# Patient Record
(demographics unavailable — no encounter records)

---

## 2024-12-12 NOTE — DATA REVIEWED
[de-identified] : Audiogram personally reviewed and interpreted and my findings are as follows (10/14/20) Right: SRT 30dB; WRS 90%; Type C tymp; normal down to mild SNHL Left: SRT 25dB; %; Type A tymp; normal down to mild SNHL

## 2024-12-12 NOTE — HISTORY OF PRESENT ILLNESS
[de-identified] : 70F who presents for 1 year follow-up for cerumen impactions, bilateral tinnitus, and bilateral SNHL. Her ears become plugged easily after a shower. Last audiogram was in 2020. She suffers from chronic constant bilateral tinnitus. It does not prevent her from sleeping. No other ENT complaints.

## 2024-12-12 NOTE — ASSESSMENT
[FreeTextEntry1] : Bilateral Cerumen Impaction - Bilateral cerumen impaction removed under microscopy with instrumentation  Bilateral Nonpulsatile Tinnitus - reviewed audiogram with patient - discussed the etiology of nonpulsatile tinnitus which is usually from SNHL - discussed treatment options which include hearing aids or medications such as tricyclics - patient would like to pursue nortriptyline 10mg QHS - The potential side effects of nortriptyline were discussed at length with the patient which include but are not limited to: dry mouth, constipation, blurred vision, drowsiness, urinary retention, weight loss or gain, excess sweating, dizziness, insomnia, nausea, tremor, confusion, anxiety, sexual dysfunction - f/u in 3 month   SNHL - bilateral symmetric normal down to mild SNHL - audiogram reviewed with patient - discussed connection between hearing loss and dementia/cognitive decline - f/u in 3 months with new audiogram

## 2024-12-12 NOTE — PHYSICAL EXAM
[TextEntry] : General: AAO, no significant distress Psychiatric: affect pleasant and within normal limits Eyes: relatively symmetric, no obvious nystagmus Skin: no significant lesions on face Nose: no significant lesions; patent. Oral Cavity & Oropharynx: no significant deformity or lesions Neck/Lymphatics: no significant masses or abnormal cervical nodes palpated Respiratory: breathing comfortably; no significant distress Neurologic: cranial nerves II-XII grossly intact; EOMI Facial function: symmetric   Ear examination performed under binocular otologic microscope: Left Ear External: Pinna and periauricular area is normal. Canal: Ear canal skin is not inflamed or edematous. Left cerumen impaction cleaned under microscopy with instrumentation Tympanic Membrane: Intact and in good position.   Right Ear External: Pinna and periauricular area is normal. Canal: Ear canal skin is not inflamed or edematous. Right cerumen impaction cleaned under microscopy with instrumentation Tympanic Membrane: Intact and in good position.   Procedure: Left cerumen removal Pre-operative Diagnosis: Left cerumen impaction Post-operative Diagnosis: Left cerumen impaction Anesthesia: None Procedure Details: The patient was placed in the supine position. The left ear canal was determined to be impacted with cerumen. A curette and/or suction was used to remove the cerumen impaction under microscopy. Condition: Stable. Patient tolerated procedure well. Complications: None.   Procedure: Right cerumen removal Pre-operative Diagnosis: Right cerumen impaction Post-operative Diagnosis: Right cerumen impaction Anesthesia: None Procedure Details: The patient was placed in the supine position. The right ear canal was determined to be impacted with cerumen. A curette and/or suction was used to remove the cerumen impaction under microscopy. Condition: Stable. Patient tolerated procedure well. Complications: None.

## 2025-03-03 NOTE — DATA REVIEWED
[de-identified] : Audiogram personally reviewed and interpreted and my findings are as follows (3/3/25): Right: SRT 30dB; %; Type C tymp; mild down to mod SNHL Left: SRT 25dB; %; Type C tymp; normal down to mild SNHL  Audiogram personally reviewed and interpreted and my findings are as follows (10/14/20) Right: SRT 30dB; WRS 90%; Type C tymp; normal down to mild SNHL Left: SRT 25dB; %; Type A tymp; normal down to mild SNHL

## 2025-03-03 NOTE — HISTORY OF PRESENT ILLNESS
[de-identified] : 70F who presents for 1 year follow-up for cerumen impactions, bilateral tinnitus, and bilateral SNHL. Her ears become plugged easily after a shower. Last audiogram was in 2020. She suffers from chronic constant bilateral tinnitus. It does not prevent her from sleeping. No other ENT complaints.  3/3/25: F/u for audiogram and bilateral nonpulsatile tinnitus. She tried the nortriptyline 10mg QHS for 3 months but noticed no change in the tinnitus.

## 2025-03-03 NOTE — ASSESSMENT
[FreeTextEntry1] : Bilateral Cerumen Impaction - 1 chronic illness - Bilateral cerumen impaction removed under microscopy with instrumentation - f/u in 3 months for routine cleaning  Bilateral Nonpulsatile Tinnitus - 1 chronic illness - reviewed audiogram with patient - discussed the etiology of nonpulsatile tinnitus which is usually from SNHL - discussed treatment options which include hearing aids or medications such as tricyclics - nortriptyline 10mg QHS - did not change tinnitus - will try amitriptyline 10mg QHS for 3 months - The potential side effects of nortriptyline were discussed at length with the patient which include but are not limited to: dry mouth, constipation, blurred vision, drowsiness, urinary retention, weight loss or gain, excess sweating, dizziness, insomnia, nausea, tremor, confusion, anxiety, sexual dysfunction - f/u in 3 months   SNHL - audiogram reviewed with patient - discussed connection between hearing loss and dementia/cognitive decline

## 2025-06-02 NOTE — HISTORY OF PRESENT ILLNESS
[de-identified] : 70F who presents for 1 year follow-up for cerumen impactions, bilateral tinnitus, and bilateral SNHL. Her ears become plugged easily after a shower. Last audiogram was in 2020. She suffers from chronic constant bilateral tinnitus. It does not prevent her from sleeping. No other ENT complaints.  3/3/25: F/u for audiogram and bilateral nonpulsatile tinnitus. She tried the nortriptyline 10mg QHS for 3 months but noticed no change in the tinnitus.   6/2/25: Here for routine cerumen cleaning every 3 months. Her ears are plugged up again. She tried the amitriptyline 10mg QHS but there was no change in her tinnitus.

## 2025-06-02 NOTE — DATA REVIEWED
[de-identified] : Audiogram personally reviewed and interpreted and my findings are as follows (3/3/25): Right: SRT 30dB; %; Type C tymp; mild down to mod SNHL Left: SRT 25dB; %; Type C tymp; normal down to mild SNHL  Audiogram personally reviewed and interpreted and my findings are as follows (10/14/20) Right: SRT 30dB; WRS 90%; Type C tymp; normal down to mild SNHL Left: SRT 25dB; %; Type A tymp; normal down to mild SNHL

## 2025-06-02 NOTE — ASSESSMENT
[FreeTextEntry1] : Bilateral Cerumen Impaction - 1 chronic illness - Bilateral cerumen impaction removed under microscopy with instrumentation - f/u in 3 months for routine cleaning  Bilateral Nonpulsatile Tinnitus - 1 chronic illness - prescription drug managment - reviewed audiogram with patient - discussed the etiology of nonpulsatile tinnitus which is usually from SNHL - discussed treatment options which include hearing aids or medications such as tricyclics - s/p nortriptyline 10mg QHS - did not change tinnitus - s/p amitriptyline 10mg QHS - did not change tinnitus - stop amitriptyline 10mg QHS   SNHL - audiogram reviewed with patient - discussed connection between hearing loss and dementia/cognitive decline